# Patient Record
Sex: FEMALE | URBAN - METROPOLITAN AREA
[De-identification: names, ages, dates, MRNs, and addresses within clinical notes are randomized per-mention and may not be internally consistent; named-entity substitution may affect disease eponyms.]

---

## 2017-03-08 ENCOUNTER — APPOINTMENT (RX ONLY)
Age: 22
Setting detail: DERMATOLOGY
End: 2017-03-08

## 2017-03-08 DIAGNOSIS — L73.9 FOLLICULAR DISORDER, UNSPECIFIED: ICD-10-CM

## 2017-03-08 DIAGNOSIS — L0390 CELLULITIS AND ABSCESS OF UNSPECIFIED SITES: ICD-10-CM

## 2017-03-08 DIAGNOSIS — L663 OTHER SPECIFIED DISEASES OF HAIR AND HAIR FOLLICLES: ICD-10-CM

## 2017-03-08 DIAGNOSIS — L20.89 OTHER ATOPIC DERMATITIS: ICD-10-CM

## 2017-03-08 DIAGNOSIS — L0391 CELLULITIS AND ABSCESS OF UNSPECIFIED SITES: ICD-10-CM

## 2017-03-08 DIAGNOSIS — L738 OTHER SPECIFIED DISEASES OF HAIR AND HAIR FOLLICLES: ICD-10-CM

## 2017-03-08 PROBLEM — L02.425 FURUNCLE OF RIGHT LOWER LIMB: Status: ACTIVE | Noted: 2017-03-08

## 2017-03-08 PROBLEM — L02.416 CUTANEOUS ABSCESS OF LEFT LOWER LIMB: Status: ACTIVE | Noted: 2017-03-08

## 2017-03-08 PROBLEM — L02.426 FURUNCLE OF LEFT LOWER LIMB: Status: ACTIVE | Noted: 2017-03-08

## 2017-03-08 PROCEDURE — ? ORDER TESTS

## 2017-03-08 PROCEDURE — ? INCISION AND DRAINAGE

## 2017-03-08 PROCEDURE — 99213 OFFICE O/P EST LOW 20 MIN: CPT | Mod: 25

## 2017-03-08 PROCEDURE — ? DIAGNOSIS COMMENT

## 2017-03-08 PROCEDURE — 10061 I&D ABSCESS COMP/MULTIPLE: CPT

## 2017-03-08 PROCEDURE — ? PRESCRIPTION

## 2017-03-08 RX ORDER — BETAMETHASONE DIPROPIONATE 0.5 MG/G
OINTMENT TOPICAL
Qty: 1 | Refills: 0 | Status: ERX | COMMUNITY
Start: 2017-03-08

## 2017-03-08 RX ORDER — CLINDAMYCIN PHOSPHATE 10 MG/ML
SOLUTION TOPICAL
Qty: 1 | Refills: 11 | Status: ERX

## 2017-03-08 RX ADMIN — BETAMETHASONE DIPROPIONATE: 0.5 OINTMENT TOPICAL at 00:00

## 2017-03-08 ASSESSMENT — SEVERITY ASSESSMENT: SEVERITY: MILD

## 2017-03-08 ASSESSMENT — LOCATION DETAILED DESCRIPTION DERM
LOCATION DETAILED: LEFT ANTERIOR PROXIMAL THIGH
LOCATION DETAILED: LEFT PROXIMAL MEDIAL POSTERIOR THIGH
LOCATION DETAILED: RIGHT ANTERIOR PROXIMAL THIGH

## 2017-03-08 ASSESSMENT — LOCATION SIMPLE DESCRIPTION DERM
LOCATION SIMPLE: LEFT THIGH
LOCATION SIMPLE: LEFT POSTERIOR THIGH
LOCATION SIMPLE: RIGHT THIGH

## 2017-03-08 ASSESSMENT — LOCATION ZONE DERM
LOCATION ZONE: LEG
LOCATION ZONE: LEG

## 2017-03-08 NOTE — PROCEDURE: DIAGNOSIS COMMENT
Detail Level: Zone
Comment: DDx includes hidradenitis (most likely unless culture grows staph) and chronic staph folliculitis

## 2017-03-08 NOTE — HPI: FREE FORM (FOLLOW UP HISTORY)
How Severe Is Your Skin Condition?: mild
What Brings You In Today For Follow Up? (This Is An Xx Year Old Patient Who Is Following Up For:): Atopic Dermatitis
Where On Your Body Is It? (Located On:): Arms
What Was It Treated With? (The Condition Was Treated With:): Triamcinolone
Since The Treatment Is Your Condition Better, Worse, Or Unchanged? (Since The Treatment, The Condition Is:): Unchanged

## 2017-09-27 ENCOUNTER — APPOINTMENT (RX ONLY)
Age: 22
Setting detail: DERMATOLOGY
End: 2017-09-27

## 2017-09-27 DIAGNOSIS — L73.2 HIDRADENITIS SUPPURATIVA: ICD-10-CM

## 2017-09-27 DIAGNOSIS — L20.89 OTHER ATOPIC DERMATITIS: ICD-10-CM

## 2017-09-27 PROCEDURE — ? RECOMMENDATIONS

## 2017-09-27 PROCEDURE — 99213 OFFICE O/P EST LOW 20 MIN: CPT

## 2017-09-27 PROCEDURE — ? PRESCRIPTION

## 2017-09-27 PROCEDURE — ? DIAGNOSIS COMMENT

## 2017-09-27 RX ORDER — DOXYCYCLINE 100 MG/1
CAPSULE ORAL
Qty: 28 | Refills: 3 | Status: ERX | COMMUNITY
Start: 2017-09-27

## 2017-09-27 RX ADMIN — DOXYCYCLINE: 100 CAPSULE ORAL at 00:00

## 2017-09-27 ASSESSMENT — LOCATION SIMPLE DESCRIPTION DERM: LOCATION SIMPLE: LEFT FOREARM

## 2017-09-27 ASSESSMENT — LOCATION ZONE DERM: LOCATION ZONE: ARM

## 2017-09-27 ASSESSMENT — LOCATION DETAILED DESCRIPTION DERM: LOCATION DETAILED: LEFT VENTRAL DISTAL FOREARM

## 2017-09-27 NOTE — PROCEDURE: RECOMMENDATIONS
Recommendations (Free Text): -Doxycycline 100 mg twice daily.\\n-Side effects discussed including rash, G.I. upset, esophagitis, increased sun sensitivity. Also discussed importance of pregnancy prevention.\\n-Clindamycin solution twice daily\\n-She has resumed Hibiclens\\n-f/u if not better in about 1 month, sooner if needed. She declined exam of the lesions in the groin. She does not have sinus tracts or fistulas.
Detail Level: Simple

## 2017-09-27 NOTE — HPI: RASH
How Severe Is Your Rash?: moderate
Is This A New Presentation, Or A Follow-Up?: Rash
Additional History: She also has a bump in left axilla for couple of days and she reports it's painful.\\Nathaniel is present today for further evaluation and management.

## 2017-09-27 NOTE — PROCEDURE: DIAGNOSIS COMMENT
Detail Level: Simple
Comment: She has an inflammatory cystic nodule in the left axilla and a smaller lesion in the right axilla.  This may be early HS, although not well-developed lesions. The lesions are tender. Since the I&D did not result in resolution of similar lesions in the groin and the lesions are not fluctuant, so will hold off on I&D today.
Comment: She has erythematous House on the left wrist. This is consistent with her atopic dermatitis. She has postinflammatory hyperpigmentation on the right wrist. Will treat with topical steroid, fluocinonide. She will follow up in one month if not improved.

## 2017-09-28 RX ORDER — FLUOCINONIDE 0.5 MG/ML
CREAM TOPICAL
Qty: 1 | Refills: 1 | Status: ERX

## 2017-09-28 RX ORDER — CLINDAMYCIN PHOSPHATE 10 MG/ML
SOLUTION TOPICAL
Qty: 1 | Refills: 6 | Status: ERX

## 2018-06-11 NOTE — PROCEDURE: INCISION AND DRAINAGE
Size Of Lesion In Cm (Optional But May Be Required For Some Insurances): 0
Curette: No
Method: 11 blade
Consent was obtained and risks were reviewed including but not limited to delayed wound healing, infection, need for multiple I and D's, and pain.
Epidermal Closure: simple interrupted
Anesthesia Type: 1% lidocaine with epinephrine
Anesthesia Volume In Cc: 8
No
Epidermal Sutures: 4-0 Ethilon
Lesion Type: Abscess
Curette Text (Optional): After the contents were expressed a curette was used.
Post-Care Instructions: I reviewed with the patient in detail post-care instructions. Patient should keep wound covered and call the office should any redness, pain, swelling or worsening occur.
Suture Text: The incision was partially closed with
I&D: multiple
Detail Level: Simple
Dressing: pressure dressing with telfa
Packing?: iodoform packing strips
Wound Care: Aquaphor
Detail Level: Detailed

## 2018-08-06 ENCOUNTER — APPOINTMENT (RX ONLY)
Age: 23
Setting detail: DERMATOLOGY
End: 2018-08-06

## 2018-08-06 DIAGNOSIS — L30.9 DERMATITIS, UNSPECIFIED: ICD-10-CM

## 2018-08-06 PROCEDURE — ? PRESCRIPTION

## 2018-08-06 RX ORDER — TRIAMCINOLONE ACETONIDE 1 MG/G
CREAM TOPICAL
Qty: 1 | Refills: 6 | Status: ERX | COMMUNITY
Start: 2018-08-06

## 2018-08-06 RX ADMIN — TRIAMCINOLONE ACETONIDE: 1 CREAM TOPICAL at 00:00

## 2019-07-09 ENCOUNTER — APPOINTMENT (RX ONLY)
Age: 24
Setting detail: DERMATOLOGY
End: 2019-07-09

## 2019-07-09 DIAGNOSIS — L20.89 OTHER ATOPIC DERMATITIS: ICD-10-CM

## 2019-07-09 PROCEDURE — ? PRESCRIPTION

## 2019-07-09 PROCEDURE — ? DIAGNOSIS COMMENT

## 2019-07-09 RX ORDER — FLUOCINONIDE 0.5 MG/G
CREAM TOPICAL
Qty: 1 | Refills: 0 | Status: ERX | COMMUNITY
Start: 2019-07-09

## 2019-07-09 RX ADMIN — FLUOCINONIDE: 0.5 CREAM TOPICAL at 00:00

## 2020-04-19 ENCOUNTER — APPOINTMENT (RX ONLY)
Age: 25
Setting detail: DERMATOLOGY
End: 2020-04-19

## 2020-04-19 DIAGNOSIS — L73.2 HIDRADENITIS SUPPURATIVA: ICD-10-CM

## 2020-04-19 PROCEDURE — ? PRESCRIPTION

## 2020-04-20 ENCOUNTER — RX ONLY (OUTPATIENT)
Age: 25
Setting detail: RX ONLY
End: 2020-04-20

## 2020-04-20 ENCOUNTER — APPOINTMENT (RX ONLY)
Age: 25
Setting detail: DERMATOLOGY
End: 2020-04-20

## 2020-04-20 DIAGNOSIS — L0391 CELLULITIS AND ABSCESS OF UNSPECIFIED SITES: ICD-10-CM

## 2020-04-20 DIAGNOSIS — L73.2 HIDRADENITIS SUPPURATIVA: ICD-10-CM

## 2020-04-20 DIAGNOSIS — L0390 CELLULITIS AND ABSCESS OF UNSPECIFIED SITES: ICD-10-CM

## 2020-04-20 PROBLEM — L02.411 CUTANEOUS ABSCESS OF RIGHT AXILLA: Status: ACTIVE | Noted: 2020-04-20

## 2020-04-20 PROCEDURE — 99213 OFFICE O/P EST LOW 20 MIN: CPT | Mod: 25

## 2020-04-20 PROCEDURE — 10061 I&D ABSCESS COMP/MULTIPLE: CPT

## 2020-04-20 PROCEDURE — 11104 PUNCH BX SKIN SINGLE LESION: CPT

## 2020-04-20 PROCEDURE — ? INCISION AND DRAINAGE

## 2020-04-20 PROCEDURE — ? PRESCRIPTION

## 2020-04-20 PROCEDURE — ? ORDER TESTS

## 2020-04-20 PROCEDURE — ? BIOPSY BY PUNCH METHOD

## 2020-04-20 RX ORDER — CLINDAMYCIN PHOSPHATE 10 MG/ML
SOLUTION TOPICAL
Qty: 1 | Refills: 11 | Status: ERX | COMMUNITY
Start: 2020-04-20

## 2020-04-20 RX ADMIN — CLINDAMYCIN PHOSPHATE: 10 SOLUTION TOPICAL at 00:00

## 2020-04-20 ASSESSMENT — LOCATION SIMPLE DESCRIPTION DERM
LOCATION SIMPLE: RIGHT AXILLARY VAULT
LOCATION SIMPLE: RIGHT POSTERIOR AXILLA

## 2020-04-20 ASSESSMENT — LOCATION ZONE DERM: LOCATION ZONE: AXILLAE

## 2020-04-20 ASSESSMENT — LOCATION DETAILED DESCRIPTION DERM
LOCATION DETAILED: RIGHT AXILLARY VAULT
LOCATION DETAILED: RIGHT POSTERIOR AXILLA

## 2020-04-20 NOTE — HPI: FREE FORM (INITIAL HISTORY)
How Severe Is Your Skin Condition? (The Patient Describes The Severity Level As....): severe
What Brings You In Today? (This Is An Xx Year Old Patient Who Presents For...): lump
When Did You First Notice It? (The Patient First Noticed It...): 5-7 days ago
Where On Your Body Is It? (Located On...): right axilla
Any Associated Symptoms? (The Symptoms Include.....): painful, new, red
What Previous Treatments Have You Tried? (The Patient Has Tried The Following Treatments...): she started a course of cephalexin yesterday, no real improvement since then
Did Anything Happen To Make You Want To Come In For This Specifically Today? (The Specific Reason That The Patient Came In Today Was Because:): further evaluation and management.\\Juan has a hx of hidradenitis suppurativa. She has had similar lesions in the past, but smaller and not as painful.\\Juan is not using any medications for HS. She is out of topical clindamycin.\\nDenies fevers.

## 2020-04-20 NOTE — PROCEDURE: BIOPSY BY PUNCH METHOD
Validate Triangulation: No
Validate Note Data (See Information Below): Yes
Epidermal Sutures: none, closed by secondary intention
Additional Anesthesia Volume In Cc (Will Not Render If 0): 0
Detail Level: Detailed
Biopsy Type: H and E
Post-Care Instructions: Today you had a skin biopsy done at [facility].\\n\\nLeave the bandage in place for 24 hours. After 24 hours, remove the bandage and clean with gentle soap and water. Please clean the site with soap and water at least daily. Apply Vaseline or Aquaphor ointment to the site at least once or twice daily.\\n\\nAfter the first 24 hours, you may leave the site uncovered or, if you choose, you may cover it with a new bandage. \\n\\nIf a scab develops over the biopsy site, please do not pick it off. \\n \\nPlease keep the area protected from theh sun with sun screen or protective clothing, such as a hat. \\n\\nPlease return to the office to have the sutures removed in 10 days.\\n\\nIf you develop increasing pain, redness, or drainage at the site, or any other concerns, please contact us.\\n\\nYou should receive your biopsy results generally within 7 to 14 days (either at a return visit or by phone). If you do not receive your results within 2 weeks, it is important that you contact us.
Billing Type: Third-Party Bill
Notification Instructions: Patient will be notified of biopsy results. However, patient instructed to call the office if not contacted within 2 weeks.
Punch Size In Mm: 12
Wound Care: Aquaphor
Dressing: dry sterile dressing
Information: Selecting Yes will display possible errors in your note based on the variables you have selected. This validation is only offered as a suggestion for you. PLEASE NOTE THAT THE VALIDATION TEXT WILL BE REMOVED WHEN YOU FINALIZE YOUR NOTE. IF YOU WANT TO FAX A PRELIMINARY NOTE YOU WILL NEED TO TOGGLE THIS TO 'NO' IF YOU DO NOT WANT IT IN YOUR FAXED NOTE.
Consent: Verbal consent was obtained and risks were reviewed including but not limited to scarring, infection, bleeding, incomplete removal, nerve damage and allergy to anesthesia.
Anesthesia Type: 2% lidocaine with epinephrine
Hemostasis: None
Anesthesia Volume In Cc: 5
Home Suture Removal Text: .

## 2020-04-20 NOTE — PROCEDURE: INCISION AND DRAINAGE
Dressing: dry sterile dressing
Detail Level: Detailed
Render Postcare In Note?: No
Method: 8 mm punch
Size Of Lesion In Cm (Optional But May Be Required For Some Insurances): 10
Anesthesia Volume In Cc: 20
Consent was obtained and risks were reviewed including but not limited to delayed wound healing, infection, need for multiple I and D's, and pain.
Wound Care: Petrolatum
Anesthesia Type: 2% lidocaine with epinephrine and a 1:10 solution of 8.4% sodium bicarbonate
Drainage Amount?: minimal
Curette Text (Optional): After the contents were expressed a curette was used to partially remove the cyst wall.
Suture Text: The incision was partially closed with
Post-Care Instructions: I reviewed with the patient in detail post-care instructions. Patient should keep wound covered and call the office should any redness, pain, swelling or worsening occur.
Epidermal Closure: simple interrupted
I&D Type: complicated
Lesion Type: Abscess
Drainage Type?: clear
Epidermal Sutures: 4-0 Ethilon

## 2020-04-21 ENCOUNTER — APPOINTMENT (RX ONLY)
Age: 25
Setting detail: DERMATOLOGY
End: 2020-04-21

## 2020-04-21 DIAGNOSIS — R52 PAIN, UNSPECIFIED: ICD-10-CM

## 2020-04-21 PROCEDURE — ? DIAGNOSIS COMMENT

## 2020-04-21 PROCEDURE — ? PRESCRIPTION

## 2020-04-22 ENCOUNTER — APPOINTMENT (RX ONLY)
Age: 25
Setting detail: DERMATOLOGY
End: 2020-04-22

## 2020-04-22 DIAGNOSIS — Z48.817 ENCOUNTER FOR SURGICAL AFTERCARE FOLLOWING SURGERY ON THE SKIN AND SUBCUTANEOUS TISSUE: ICD-10-CM

## 2020-04-22 PROCEDURE — ? POST-OP WOUND CHECK

## 2020-04-22 PROCEDURE — 99024 POSTOP FOLLOW-UP VISIT: CPT

## 2020-04-22 ASSESSMENT — LOCATION SIMPLE DESCRIPTION DERM: LOCATION SIMPLE: RIGHT AXILLARY VAULT

## 2020-04-22 ASSESSMENT — LOCATION ZONE DERM: LOCATION ZONE: AXILLAE

## 2020-04-22 ASSESSMENT — LOCATION DETAILED DESCRIPTION DERM: LOCATION DETAILED: RIGHT AXILLARY VAULT

## 2020-04-22 NOTE — PROCEDURE: POST-OP WOUND CHECK
Additional Comments: also seen by NIGEL Underwood
Wound Evaluated By: Darlyn Sam MD, PhD
Add 28565 Cpt? (Important Note: In 2017 The Use Of 27599 Is Being Tracked By Cms To Determine Future Global Period Reimbursement For Global Periods): yes
Detail Level: Simple

## 2020-05-05 ENCOUNTER — APPOINTMENT (RX ONLY)
Age: 25
Setting detail: DERMATOLOGY
End: 2020-05-05

## 2020-05-05 DIAGNOSIS — L73.2 HIDRADENITIS SUPPURATIVA: ICD-10-CM

## 2020-05-05 DIAGNOSIS — L20.89 OTHER ATOPIC DERMATITIS: ICD-10-CM

## 2020-05-05 PROBLEM — L20.84 INTRINSIC (ALLERGIC) ECZEMA: Status: ACTIVE | Noted: 2020-05-05

## 2020-05-05 PROCEDURE — ? DIAGNOSIS COMMENT

## 2020-05-05 PROCEDURE — 99213 OFFICE O/P EST LOW 20 MIN: CPT

## 2020-05-05 PROCEDURE — ? PRESCRIPTION

## 2020-05-05 RX ORDER — TRIAMCINOLONE ACETONIDE 1 MG/G
CREAM TOPICAL
Qty: 1 | Refills: 1 | Status: ERX | COMMUNITY
Start: 2020-05-05

## 2020-05-05 RX ORDER — CLOBETASOL PROPIONATE 0.5 MG/G
OINTMENT TOPICAL
Qty: 1 | Refills: 0 | Status: ERX | COMMUNITY
Start: 2020-05-05

## 2020-05-05 RX ADMIN — CLOBETASOL PROPIONATE: 0.5 OINTMENT TOPICAL at 00:00

## 2020-05-05 RX ADMIN — TRIAMCINOLONE ACETONIDE: 1 CREAM TOPICAL at 00:00

## 2020-05-05 ASSESSMENT — LOCATION DETAILED DESCRIPTION DERM
LOCATION DETAILED: LEFT ELBOW
LOCATION DETAILED: RIGHT AXILLARY VAULT
LOCATION DETAILED: RIGHT LATERAL SUPERIOR CHEST

## 2020-05-05 ASSESSMENT — LOCATION SIMPLE DESCRIPTION DERM
LOCATION SIMPLE: CHEST
LOCATION SIMPLE: RIGHT AXILLARY VAULT
LOCATION SIMPLE: LEFT ELBOW

## 2020-05-05 ASSESSMENT — LOCATION ZONE DERM
LOCATION ZONE: TRUNK
LOCATION ZONE: AXILLAE
LOCATION ZONE: ARM

## 2020-05-05 NOTE — PROCEDURE: DIAGNOSIS COMMENT
Detail Level: Zone
Detail Level: Simple
Comment: I&D site is much better. Small area remains that has not closed. There is some exuberant granulation tissue in the area that has not fully closed (looks clean, not infected), so will start clobetasol ointment to this area 1-2x/day. She will use Hypafix as bandage tape, as it will be less likely to irritate her skin. \\nOf note, path showed features consistent with HS. \\nFollow-up in 2 weeks, sooner if needed.
Comment: Pt has eczema on left elbow and some irritant eczema (dermatitis) from bandage tape on right chest and back (surrounding axilla.) She has a hx of eczema but left her topical steroids in [place]. Will refill triamcinolone. Reviewed SEs.

## 2020-05-05 NOTE — HPI: FREE FORM (FOLLOW UP HISTORY)
How Severe Is Your Skin Condition?: moderate
What Brings You In Today For Follow Up? (This Is An Xx Year Old Patient Who Is Following Up For:): abscess, related to hidradenitis suppurativa
Where On Your Body Is It? (Located On:): right axilla
What Was It Treated With? (The Condition Was Treated With:): I&D, pain medication, wound care
Since The Treatment Is Your Condition Better, Worse, Or Unchanged? (Since The Treatment, The Condition Is:): better. Pain is still there but much improved. \\nShe is getting some skin irritation from the bandage tape.

## 2020-05-18 ENCOUNTER — APPOINTMENT (RX ONLY)
Age: 25
Setting detail: DERMATOLOGY
End: 2020-05-18

## 2020-05-18 DIAGNOSIS — L85.8 OTHER SPECIFIED EPIDERMAL THICKENING: ICD-10-CM

## 2020-05-18 DIAGNOSIS — L73.2 HIDRADENITIS SUPPURATIVA: ICD-10-CM

## 2020-05-18 PROCEDURE — ? DIAGNOSIS COMMENT

## 2020-05-18 PROCEDURE — 99213 OFFICE O/P EST LOW 20 MIN: CPT

## 2020-05-18 ASSESSMENT — LOCATION ZONE DERM
LOCATION ZONE: AXILLAE
LOCATION ZONE: FEET

## 2020-05-18 ASSESSMENT — LOCATION SIMPLE DESCRIPTION DERM
LOCATION SIMPLE: RIGHT POSTERIOR AXILLA
LOCATION SIMPLE: RIGHT PLANTAR SURFACE
LOCATION SIMPLE: LEFT PLANTAR SURFACE

## 2020-05-18 ASSESSMENT — LOCATION DETAILED DESCRIPTION DERM
LOCATION DETAILED: RIGHT MEDIAL PLANTAR HEEL
LOCATION DETAILED: LEFT MEDIAL PLANTAR HEEL
LOCATION DETAILED: RIGHT POSTERIOR AXILLA

## 2020-05-18 NOTE — PROCEDURE: DIAGNOSIS COMMENT
Detail Level: Simple
Comment: much better. Area which was treated has almost completely healed in. Some scar remains. Consider ILK in future if symptomatic or becomes hypertrophic. There is no pain, drainage or itching. She can stop covering with a bandage and stop clobetasol. She will f/u as needed for flares. She has clindamycin solution to use. She is planning to go back to [place] for the summer, starting in mid June. Follow up here as needed.
Comment: She has thickening of the skin on the heels, but no fissures. Recommended moisturizers with urea or sal acid (such as Eucerin with urea or CeraVe with sal acid.) Call if not better and can prescribe 40% urea cream.

## 2020-11-25 ENCOUNTER — APPOINTMENT (RX ONLY)
Age: 25
Setting detail: DERMATOLOGY
End: 2020-11-25

## 2020-11-25 VITALS — TEMPERATURE: 98.1 F

## 2020-11-25 DIAGNOSIS — L0390 CELLULITIS AND ABSCESS OF UNSPECIFIED SITES: ICD-10-CM

## 2020-11-25 DIAGNOSIS — L73.2 HIDRADENITIS SUPPURATIVA: ICD-10-CM

## 2020-11-25 DIAGNOSIS — L0391 CELLULITIS AND ABSCESS OF UNSPECIFIED SITES: ICD-10-CM

## 2020-11-25 PROBLEM — L02.411 CUTANEOUS ABSCESS OF RIGHT AXILLA: Status: ACTIVE | Noted: 2020-11-25

## 2020-11-25 PROCEDURE — ? PATIENT SPECIFIC COUNSELING

## 2020-11-25 PROCEDURE — ? COUNSELING

## 2020-11-25 PROCEDURE — 99213 OFFICE O/P EST LOW 20 MIN: CPT | Mod: 25

## 2020-11-25 PROCEDURE — ? INCISION AND DRAINAGE

## 2020-11-25 PROCEDURE — 10060 I&D ABSCESS SIMPLE/SINGLE: CPT

## 2020-11-25 ASSESSMENT — LOCATION DETAILED DESCRIPTION DERM
LOCATION DETAILED: RIGHT POSTERIOR AXILLA
LOCATION DETAILED: RIGHT ANTERIOR AXILLA

## 2020-11-25 ASSESSMENT — LOCATION ZONE DERM: LOCATION ZONE: AXILLAE

## 2020-11-25 ASSESSMENT — LOCATION SIMPLE DESCRIPTION DERM
LOCATION SIMPLE: RIGHT ANTERIOR AXILLA
LOCATION SIMPLE: RIGHT POSTERIOR AXILLA

## 2020-11-25 NOTE — PROCEDURE: PATIENT SPECIFIC COUNSELING
Detail Level: Detailed
**Counseled the pt on side effects of oral birth control pills, specifically blood clots. We also discussed the side effects of metformin. I will email her endocrinologist to discuss changing her medications.

## 2020-11-25 NOTE — PROCEDURE: INCISION AND DRAINAGE
Detail Level: Detailed
Render Postcare In Note?: No
Size Of Lesion In Cm (Optional But May Be Required For Some Insurances): 0
Anesthesia Type: 2% lidocaine with epinephrine
Wound Care: Aquaphor
Consent was obtained after risks were reviewed including but not limited to delayed wound healing, infection, need for multiple I and D's, and pain.
Epidermal Closure: simple interrupted
Method: 8 mm punch
Post-Care Instructions: After Incision & Drainage of an abscess\\n\\nAn abscess (sometimes called a “boil”) occurs when bacteria get trapped under the skin and begin to grow. Pus forms inside the abscess as the body responds to the bacteria. An abscess can occur with an insect bite, ingrown hair, blocked oil gland, pimple, cyst, or puncture wound.\\nTreatment of your abscess has required an incision to drain the pus. If the abscess pocket was large, gauze packing may have been inserted. This will need to be removed and possibly replaced on your next visit. Antibiotics are not required in the treatment of a simple abscess, unless the infection is spreading into the skin around the wound (known as “cellulitis”).\\nHealing of the wound will take about one to two weeks depending on the size of the abscess. Healthy tissue will grow from the bottom and sides of the opening until it seals over.\\n\\nHome Care:\\nThe wound may drain for the first two days. Cover the wound with a clean dry dressing. If the dressing becomes soaked with blood or pus, change it.\\nIf a gauze packing was placed inside the abscess cavity, you may be advised to remove it yourself (only if instructed by staff) You may do this in the shower. Once the packing is removed, you should wash the area in the shower or bath 3 to 4 times a day, until the skin opening has closed.\\nIf you were prescribed antibiotics, take them as directed until they are all gone.\\nYou may use acetaminophen (Tylenol) or ibuprofen (Motrin, Advil) to control pain, unless another pain medicine was prescribed. \\n***NOTE: If you have liver disease or ever had a stomach ulcer, talk with your doctor before using these medicines.\\n\\nFollow Up:\\nFollow up in our clinic as advised by our staff. If a gauze packing was inserted in your wound, it should be removed in 1-3 days. Check your wound every day for the signs of worsening (see list below).\\nGet prompt medical attention if any of the following occur:\\nIncreasing redness or swelling\\nRed streaks in the skin leading away from the wound\\nIncreasing local pain or swelling\\nContinued pus draining from the wound two days after treatment\\nFever of 100.4ºF (38ºC) or higher, or as directed by your healthcare provider
Dressing: dry sterile dressing
Lesion Type: Abscess
Epidermal Sutures: 4-0 Ethilon
Preparation Text: The area was prepped with chlorhexidine scrub.
Suture Text: The incision was partially closed with

## 2021-02-09 ENCOUNTER — APPOINTMENT (RX ONLY)
Age: 26
Setting detail: DERMATOLOGY
End: 2021-02-09

## 2021-02-09 DIAGNOSIS — L73.2 HIDRADENITIS SUPPURATIVA: ICD-10-CM

## 2021-02-09 PROCEDURE — ? PRESCRIPTION

## 2021-02-09 RX ORDER — DROSPIRENONE AND ETHINYL ESTRADIOL 0.02-3(28)
KIT ORAL
Qty: 3 | Refills: 3 | Status: ERX | COMMUNITY
Start: 2021-02-09

## 2021-02-09 RX ADMIN — DROSPIRENONE AND ETHINYL ESTRADIOL: KIT at 00:00

## 2021-06-01 ENCOUNTER — RX ONLY (OUTPATIENT)
Age: 26
Setting detail: RX ONLY
End: 2021-06-01

## 2021-06-01 RX ORDER — CLINDAMYCIN PHOSPHATE 10 MG/ML
SOLUTION TOPICAL
Qty: 1 | Refills: 11 | Status: ERX